# Patient Record
(demographics unavailable — no encounter records)

---

## 2024-10-21 NOTE — ASSESSMENT
[FreeTextEntry1] : 30y/o male with penile discharge for two weeks Reports improvement of symptoms after ABX.  Had burning urination, still eating occasionally citrus, chocolate, coffee.  Comes for recurrent symptoms.   FHx of PCA: Denies Tobacco use: Current smoker  Last PSA:  N/A Last Creatinine:   N/A Last UCx: Negative (06/03/2024)  Uro Meds: none  06/03/2024 - Performing prostatic massage with collection of discharge for culture. Will F/U in case of positive results.  Collecting urine for UA and Culture  Will F/U in case of positive results  In case of negative results and still symptoms, the patient will come back to office for prostatic massage and collection of discharge for culture.

## 2024-10-21 NOTE — HISTORY OF PRESENT ILLNESS
[FreeTextEntry1] : 28y/o male with penile discharge for two weeks Reports improvement of symptoms after ABX.  Had burning urination, still eating occasionally citrus, chocolate, coffee.  Comes for recurrent symptoms.   FHx of PCA: Denies Tobacco use: Current smoker  Last PSA:  N/A Last Creatinine:   N/A Last UCx: Negative (06/03/2024)  Uro Meds: none  06/03/2024 - Performing prostatic massage with collection of discharge for culture. Will F/U in case of positive results.

## 2024-10-21 NOTE — HISTORY OF PRESENT ILLNESS
[FreeTextEntry1] : 30y/o male with penile discharge for two weeks Reports improvement of symptoms after ABX.  Had burning urination, still eating occasionally citrus, chocolate, coffee.  Comes for recurrent symptoms.   FHx of PCA: Denies Tobacco use: Current smoker  Last PSA:  N/A Last Creatinine:   N/A Last UCx: Negative (06/03/2024)  Uro Meds: none  06/03/2024 - Performing prostatic massage with collection of discharge for culture. Will F/U in case of positive results.